# Patient Record
(demographics unavailable — no encounter records)

---

## 2025-03-25 NOTE — HISTORY OF PRESENT ILLNESS
[FreeTextEntry1] : Patient is a 68-year-old female who presents for a annual gynecologic examination.  Patient has no complaints.  Patient uses estradiol vaginal tablets.  Patient has osteoporosis of the spine.  Patient was previously on risedronate however osteoporosis in the spine was worsening patient had been advised to start Prolia patient declined.  Discussed this issue with patient advised patient to have a follow-up bone density to see if there have been any changes.  Patient has hypothyroidism as well.

## 2025-03-25 NOTE — DISCUSSION/SUMMARY
[FreeTextEntry1] : Impression: Vulvovaginal atrophy, hypothyroidism, osteoporosis, otherwise normal GYN exam  Recommendations: Self breast exam, mammography annually, bone density DEXA, calcium and vitamin D supplementation regular exercise  Continue estradiol vaginal tablets use as directed  Follow-up in 1 year